# Patient Record
Sex: MALE | Race: BLACK OR AFRICAN AMERICAN | NOT HISPANIC OR LATINO | Employment: FULL TIME | URBAN - METROPOLITAN AREA
[De-identification: names, ages, dates, MRNs, and addresses within clinical notes are randomized per-mention and may not be internally consistent; named-entity substitution may affect disease eponyms.]

---

## 2024-08-22 ENCOUNTER — HOSPITAL ENCOUNTER (OUTPATIENT)
Dept: RADIOLOGY | Facility: HOSPITAL | Age: 24
Discharge: HOME | End: 2024-08-22
Payer: MEDICARE

## 2024-08-22 ENCOUNTER — OFFICE VISIT (OUTPATIENT)
Dept: ORTHOPEDIC SURGERY | Facility: HOSPITAL | Age: 24
End: 2024-08-22
Payer: MEDICARE

## 2024-08-22 DIAGNOSIS — M25.812 IMPINGEMENT OF LEFT SHOULDER: ICD-10-CM

## 2024-08-22 DIAGNOSIS — M25.512 LEFT SHOULDER PAIN, UNSPECIFIED CHRONICITY: ICD-10-CM

## 2024-08-22 DIAGNOSIS — M25.312 INSTABILITY OF LEFT SHOULDER JOINT: Primary | ICD-10-CM

## 2024-08-22 PROCEDURE — 99214 OFFICE O/P EST MOD 30 MIN: CPT

## 2024-08-22 PROCEDURE — 73030 X-RAY EXAM OF SHOULDER: CPT | Mod: LT

## 2024-08-22 PROCEDURE — 99204 OFFICE O/P NEW MOD 45 MIN: CPT

## 2024-08-22 RX ORDER — METHYLPREDNISOLONE 4 MG/1
TABLET ORAL
Qty: 21 TABLET | Refills: 0 | Status: SHIPPED | OUTPATIENT
Start: 2024-08-22 | End: 2024-08-29

## 2024-08-22 ASSESSMENT — PAIN DESCRIPTION - DESCRIPTORS: DESCRIPTORS: NUMBNESS;TINGLING

## 2024-08-22 ASSESSMENT — PAIN - FUNCTIONAL ASSESSMENT: PAIN_FUNCTIONAL_ASSESSMENT: 0-10

## 2024-08-22 NOTE — PROGRESS NOTES
PRIMARY CARE PHYSICIAN: Coreen Whitman DO  REFERRING PROVIDER: No referring provider defined for this encounter.     CONSULT ORTHOPAEDIC: Shoulder Evaluation    ASSESSMENT & PLAN    Impression: 24 y.o. male with left shoulder instability versus cervical radiculopathy     Plan:   I explained to the patient the nature of their diagnosis.  I reviewed their imaging studies with them.    Based on the history, physical exam and imaging studies above, the patient's presentation is consistent with the above diagnosis.  I had a long discussion with the patient regarding their presentation and the treatment options.  We discussed initial nonoperative versus operative management options as well as potential further diagnostic imaging. Patient presents today with 1 month of left shoulder pain without a specifically known ERNST. His pain has progressed to now involve some associated neurological symptoms down the left arm. Due to this patient was provided with a Medrol Dose Pack. I also provided patient with a referral to physical therapy to work on his rotator cuff strengthening and scapular stabilization. He is already scheduled to follow-up with shoulder/upper extremity specialist, Dr. Thibodeaux this coming Monday. I encouraged patient to follow up with this appointment for further evaluation and discussion of treatment options.     At the end of the visit, all questions were answered in full. The patient is in agreement with the plan and recommendations. They will call the office with any questions/concerns.    Note dictated with IActionable software. Completed without full typed error editing and sent to avoid delay.       SUBJECTIVE  CHIEF COMPLAINT:   Chief Complaint   Patient presents with    Left Shoulder - Pain        HPI: Cecil Rehman is a pleasant 24 y.o. male who presents to [facility] with left shoulder pain.  Patient reports his pain began approximately 1 month ago with no known injury.  States he could  have strained it with lifting however does not recall a specific moment.  Also reports participating in some recent water sports and feels he could have strained the left shoulder then.  Pain is localized to the posterior shoulder.  He reports some popping of the left shoulder however no feeling of instability or subluxation events.  Pain is worse at night.  He presented to his PCP for this on 8/16 and was told he had shoulder impingement.  He was prescribed naproxen which is helpful during the day however he still has significant pain at night.  His pain is worsened and now he has developed some radiation/numbness/tingling into the left arm down to hand.  This is only present at night.  Better during the day.  His PCP did provide him with a referral to sports medicine and he scheduled to see Dr. Monsalve on Monday.  He would like something done in the meantime as he is having significant difficulty with sleeping.  Denies history of neck injuries or pain.  No trauma to the shoulder.  He does not feel he is lost any range of motion but strengthening exercises are difficult.  Denies history of left shoulder issues, surgeries or injections.    They deny any associated neck pain.  No numbness, tingling, or paresthesias.    REVIEW OF SYSTEMS  Constitutional: See HPI for pain assessment, No significant recent weight gain or loss, no fever or chills  Cardiovascular: No chest pain, shortness of breath  Respiratory: No difficulty breathing, no cough  Gastrointestinal: No nausea, vomiting, diarrhea, constipation  Musculoskeletal: Noted in HPI, positive for pain, restricted motion, stiffness  Integumentary: No rashes, easy bruising or skin lesions  Neurological: No headache, no numbness or tingling in extremities  Psychiatric: No mood changes or memory changes. No social issues  Heme/Lymph: No excessive swelling, easy bruising or excessive bleeding  ENT: No hearing changes, no nosebleeds  Eyes: No vision changes    Past Medical  History:   Diagnosis Date    Cramp and spasm     Muscle cramp    Epistaxis     Frequent nosebleeds        No Known Allergies     No past surgical history on file.     No family history on file.     Social History     Socioeconomic History    Marital status: Single     Spouse name: Not on file    Number of children: Not on file    Years of education: Not on file    Highest education level: Not on file   Occupational History    Not on file   Tobacco Use    Smoking status: Not on file    Smokeless tobacco: Not on file   Substance and Sexual Activity    Alcohol use: Not on file    Drug use: Not on file    Sexual activity: Not on file   Other Topics Concern    Not on file   Social History Narrative    Not on file     Social Determinants of Health     Financial Resource Strain: Not on file   Food Insecurity: Not on file   Transportation Needs: Not on file   Physical Activity: Not on file   Stress: Not on file   Social Connections: Not on file   Intimate Partner Violence: Not on file   Housing Stability: Not on file        CURRENT MEDICATIONS:   Current Outpatient Medications   Medication Sig Dispense Refill    methylPREDNISolone (Medrol Dospak) 4 mg tablets Take as directed on package. 21 tablet 0     No current facility-administered medications for this visit.        OBJECTIVE    PHYSICAL EXAM       No data to display               There is no height or weight on file to calculate BMI.    GENERAL: A/Ox3, NAD. Appears healthy, well nourished  PSYCHIATRIC: Mood stable, appropriate memory recall  EYES: EOM intact, no scleral icterus  CARDIOVASCULAR: Palpable peripheral pulses  LUNGS: Breathing non-labored on room air  SKIN: No open lesions, rashes, ulcerations    MUSCULOSKELETAL:  Laterality: Left shoulder Exam  - Negative Spurlings, full painless neck ROM  - Skin intact  - No erythema or warmth  - No ecchymosis or soft tissue swelling  - Shoulder ROM:  forward flexion 150, EF 50, IR lower thoracic  - Strength:      Jobes  5/5     ER 5/5     Belly press and bearhug 5/5  - Palpation: positive TTP posterior joint line  - Ellis and Neers negative  - Speeds and O'Briens positive  - Special Tests: positive parmjit and jerk    NEUROVASCULAR:  - Neurovascular Status: sensation intact to light touch distally, upper extremity motor grossly intact  - Capillary refill brisk at extremities, Bilateral peripheral pulses 2+    Imaging: Multiple views of the affected left shoulder(s) demonstrate: no acute osseous abnormality, no fracture, no significant degenerative changes.   X-rays were personally reviewed and interpreted by me.  Radiology reports were reviewed by me as well, if readily available at the time.

## 2024-08-26 ENCOUNTER — APPOINTMENT (OUTPATIENT)
Dept: ORTHOPEDIC SURGERY | Facility: CLINIC | Age: 24
End: 2024-08-26
Payer: MEDICARE

## 2024-08-26 VITALS — BODY MASS INDEX: 21.67 KG/M2 | WEIGHT: 160 LBS | HEIGHT: 72 IN

## 2024-08-26 DIAGNOSIS — R20.2 NUMBNESS AND TINGLING IN LEFT HAND: ICD-10-CM

## 2024-08-26 DIAGNOSIS — M25.812 IMPINGEMENT OF LEFT SHOULDER: ICD-10-CM

## 2024-08-26 DIAGNOSIS — M25.312 MULTIDIRECTIONAL INSTABILITY OF LEFT GLENOHUMERAL JOINT: Primary | ICD-10-CM

## 2024-08-26 DIAGNOSIS — M25.312 DYSKINESIS OF LEFT SCAPULA: ICD-10-CM

## 2024-08-26 DIAGNOSIS — R20.0 NUMBNESS AND TINGLING IN LEFT HAND: ICD-10-CM

## 2024-08-26 PROCEDURE — 3008F BODY MASS INDEX DOCD: CPT | Performed by: ORTHOPAEDIC SURGERY

## 2024-08-26 PROCEDURE — 99204 OFFICE O/P NEW MOD 45 MIN: CPT | Performed by: ORTHOPAEDIC SURGERY

## 2024-08-26 ASSESSMENT — PAIN SCALES - GENERAL: PAINLEVEL_OUTOF10: 2

## 2024-08-26 ASSESSMENT — PAIN - FUNCTIONAL ASSESSMENT: PAIN_FUNCTIONAL_ASSESSMENT: 0-10
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
## 2024-09-03 ENCOUNTER — EVALUATION (OUTPATIENT)
Dept: PHYSICAL THERAPY | Facility: CLINIC | Age: 24
End: 2024-09-03
Payer: MEDICARE

## 2024-09-03 DIAGNOSIS — M25.812 IMPINGEMENT OF LEFT SHOULDER: Primary | ICD-10-CM

## 2024-09-03 DIAGNOSIS — M25.312 SHOULDER INSTABILITY, LEFT: ICD-10-CM

## 2024-09-03 PROCEDURE — 97161 PT EVAL LOW COMPLEX 20 MIN: CPT | Mod: GP | Performed by: PHYSICAL THERAPIST

## 2024-09-03 PROCEDURE — 97110 THERAPEUTIC EXERCISES: CPT | Mod: GP | Performed by: PHYSICAL THERAPIST

## 2024-09-03 ASSESSMENT — PATIENT HEALTH QUESTIONNAIRE - PHQ9
SUM OF ALL RESPONSES TO PHQ9 QUESTIONS 1 AND 2: 0
2. FEELING DOWN, DEPRESSED OR HOPELESS: NOT AT ALL
1. LITTLE INTEREST OR PLEASURE IN DOING THINGS: NOT AT ALL

## 2024-09-03 ASSESSMENT — ENCOUNTER SYMPTOMS
DEPRESSION: 0
OCCASIONAL FEELINGS OF UNSTEADINESS: 0
LOSS OF SENSATION IN FEET: 0

## 2024-09-03 ASSESSMENT — PAIN SCALES - GENERAL: PAINLEVEL_OUTOF10: 0 - NO PAIN

## 2024-09-03 ASSESSMENT — PAIN - FUNCTIONAL ASSESSMENT: PAIN_FUNCTIONAL_ASSESSMENT: 0-10
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
## 2024-09-10 ENCOUNTER — HOSPITAL ENCOUNTER (OUTPATIENT)
Dept: NEUROLOGY | Facility: CLINIC | Age: 24
Discharge: HOME | End: 2024-09-10
Payer: MEDICARE

## 2024-09-10 DIAGNOSIS — R20.2 NUMBNESS AND TINGLING IN LEFT HAND: ICD-10-CM

## 2024-09-10 DIAGNOSIS — R20.0 NUMBNESS AND TINGLING IN LEFT HAND: ICD-10-CM

## 2024-09-10 PROCEDURE — 95911 NRV CNDJ TEST 9-10 STUDIES: CPT | Performed by: STUDENT IN AN ORGANIZED HEALTH CARE EDUCATION/TRAINING PROGRAM

## 2024-09-10 PROCEDURE — 95886 MUSC TEST DONE W/N TEST COMP: CPT | Performed by: STUDENT IN AN ORGANIZED HEALTH CARE EDUCATION/TRAINING PROGRAM

## 2024-09-13 ENCOUNTER — TREATMENT (OUTPATIENT)
Dept: PHYSICAL THERAPY | Facility: CLINIC | Age: 24
End: 2024-09-13
Payer: MEDICARE

## 2024-09-13 DIAGNOSIS — M25.812 IMPINGEMENT OF LEFT SHOULDER: ICD-10-CM

## 2024-09-13 DIAGNOSIS — M25.312 SHOULDER INSTABILITY, LEFT: ICD-10-CM

## 2024-09-13 PROCEDURE — 97110 THERAPEUTIC EXERCISES: CPT | Mod: GP,CQ

## 2024-09-13 ASSESSMENT — PAIN - FUNCTIONAL ASSESSMENT: PAIN_FUNCTIONAL_ASSESSMENT: 0-10

## 2024-09-13 ASSESSMENT — PAIN SCALES - GENERAL: PAINLEVEL_OUTOF10: 0 - NO PAIN

## 2024-09-13 NOTE — PROGRESS NOTES
"Physical Therapy    Physical Therapy Treatment    Patient Name: Cecil Rehman  MRN: [id number]  Today's Date: 9/13/2024    Time Entry:   Time Calculation  Start Time: 1100  Stop Time: 1140  Time Calculation (min): 40 min     PT Therapeutic Procedures Time Entry  Therapeutic Exercise Time Entry: 40                   Assessment:   Pt reports no increase in left shoulder/UE pain at the end of his treatment. Moderately fatigued.    Plan:   Assess tolerance of today's visit.    Current Problem  1. Impingement of left shoulder  Follow Up In Physical Therapy      2. Shoulder instability, left  Follow Up In Physical Therapy                Subjective    Pt reports that he has been having bilateral hand tingling and has not been able to \"\" anything the past several days. He had an EMG yesterday. Pt has not received the results. He was recommended to make an appointment with a neurologist.    Precautions   Precautions  STEADI Fall Risk Score (The score of 4 or more indicates an increased risk of falling): 0        Pain  Pain Assessment  Pain Assessment: 0-10  0-10 (Numeric) Pain Score: 0 - No pain  Pain Location: Shoulder  Pain Orientation: Left    Objective   Reviewed home exercises  Initiated flow sheet    Treatments:  EXERCISES Date 9/3/24 Date 9/13/24 Date Date    REPS REPS REPS REPS   Mid row Black 30X Black 30x     Pull down Black 30X Black 30x     IR Black 15X Black 30x     ER Black 30X Black 30x            Ball on wall 2#  A-Z 2# A-Z            Prone- Hughstons-I, T, Y next 20x each                   Body blade                     Quadruped-wt shift onto left UE  10x10                   Pulley flex  3 min     Pulley scaption  3 min                                                                    Shoulder stabil. tape applied                    HEP           OP EDUCATION:       Goals:  Pt will have full pain free left shoulder ROM in all planes-4 weeks  Pt will have 5/5 left shoulder strength and good stability " in all planes-4 weeks  Pt will be independent with HEP-4 weeks  Improve Quickdash score < 15 to facilitate return to prior level of  function-4 weeks

## 2024-09-16 ENCOUNTER — DOCUMENTATION (OUTPATIENT)
Dept: PHYSICAL THERAPY | Facility: CLINIC | Age: 24
End: 2024-09-16
Payer: MEDICARE

## 2024-09-16 NOTE — PROGRESS NOTES
Physical Therapy                 Therapy Communication Note    Patient Name: Cecil Rehman  MRN: [id number]  Department:   Room: Room/bed info not found  Today's Date: 9/16/2024     Discipline: Physical Therapy    Missed Visit Reason:      Missed Time: No Show    Comment:

## 2024-09-20 ENCOUNTER — TREATMENT (OUTPATIENT)
Dept: PHYSICAL THERAPY | Facility: CLINIC | Age: 24
End: 2024-09-20
Payer: MEDICARE

## 2024-09-20 DIAGNOSIS — M25.312 SHOULDER INSTABILITY, LEFT: ICD-10-CM

## 2024-09-20 DIAGNOSIS — M25.812 IMPINGEMENT OF LEFT SHOULDER: ICD-10-CM

## 2024-09-20 PROCEDURE — 97110 THERAPEUTIC EXERCISES: CPT | Mod: GP | Performed by: PHYSICAL THERAPIST

## 2024-09-20 ASSESSMENT — PAIN SCALES - GENERAL: PAINLEVEL_OUTOF10: 2

## 2024-09-20 ASSESSMENT — PAIN - FUNCTIONAL ASSESSMENT: PAIN_FUNCTIONAL_ASSESSMENT: 0-10

## 2024-09-20 NOTE — PROGRESS NOTES
"Physical Therapy    Physical Therapy Treatment    Patient Name: Cecil Rehman  MRN: [id number]  Today's Date: 9/20/2024    Time Entry:   Time Calculation  Start Time: 0200  Stop Time: 0250  Time Calculation (min): 50 min     PT Therapeutic Procedures Time Entry  Therapeutic Exercise Time Entry: 40              Non-Billable Time  Non-billable time: 10  Non-billable time reason: CP    Assessment:   Tolerating progression of strengthening well, but continues to have left posterior shoulder pain with certain movements. Needs continued shoulder strengthening and stabilization to decrease vijay..    Plan:   Assess tolerance of today's visit.    Current Problem  1. Impingement of left shoulder  Follow Up In Physical Therapy      2. Shoulder instability, left  Follow Up In Physical Therapy                Subjective    Pt reports that the EMG results were normal. Reports that he returned to the gym yesterday and had increased shoulder pain.  strength and paresthesias have improved somewhat.    Precautions   Precautions  STEADI Fall Risk Score (The score of 4 or more indicates an increased risk of falling): 0        Pain  Pain Assessment  Pain Assessment: 0-10  0-10 (Numeric) Pain Score: 2  Pain Location: Shoulder  Pain Orientation: Left    Objective   Reviewed home exercises  Initiated flow sheet    Treatments:  EXERCISES Date 9/3/24 Date 9/13/24 Date 9/20/24 Date    REPS REPS REPS REPS   Mid row Black 30X Black 30x Black 30X    Pull down Black 30X Black 30x Black 30X    IR Black 15X Black 30x Black 30X    ER Black 30X Black 30x Black 30X           Ball on wall 2#  A-Z 2# A-Z 2#  A-Z           Prone- Hughstons-I, T, Y next 20x each 20X ea    Body blade-flexion and abduction   30\" X 2 ea    BTE-ROM                            Quadruped-wt shift onto left UE  10x10 10 X 10                  Pulley flex  3 min     Pulley scaption  3 min            Diagonal pull down   10#  2 X 10 ea direction                                    "                  Shoulder stabil. tape applied                    HEP           OP EDUCATION:       Goals:  Pt will have full pain free left shoulder ROM in all planes-4 weeks  Pt will have 5/5 left shoulder strength and good stability in all planes-4 weeks  Pt will be independent with HEP-4 weeks  Improve Quickdash score < 15 to facilitate return to prior level of  function-4 weeks

## 2024-09-23 ENCOUNTER — DOCUMENTATION (OUTPATIENT)
Dept: PHYSICAL THERAPY | Facility: CLINIC | Age: 24
End: 2024-09-23
Payer: MEDICARE

## 2024-09-23 ENCOUNTER — APPOINTMENT (OUTPATIENT)
Dept: PHYSICAL THERAPY | Facility: CLINIC | Age: 24
End: 2024-09-23
Payer: MEDICARE

## 2024-09-23 NOTE — PROGRESS NOTES
Physical Therapy                 Therapy Communication Note    Patient Name: Cecil Rehman  MRN: [id number]  Department: [address]  Room: Room/bed info not found  Today's Date: 9/23/2024     Discipline: Physical Therapy    Missed Visit Reason:  schedule conflict    Missed Time: Cancel    Comment:

## 2024-09-27 ENCOUNTER — TREATMENT (OUTPATIENT)
Dept: PHYSICAL THERAPY | Facility: CLINIC | Age: 24
End: 2024-09-27
Payer: MEDICARE

## 2024-09-27 DIAGNOSIS — M25.312 SHOULDER INSTABILITY, LEFT: ICD-10-CM

## 2024-09-27 DIAGNOSIS — M25.812 IMPINGEMENT OF LEFT SHOULDER: ICD-10-CM

## 2024-09-27 PROCEDURE — 97110 THERAPEUTIC EXERCISES: CPT | Mod: GP,CQ

## 2024-09-27 ASSESSMENT — PAIN SCALES - GENERAL: PAINLEVEL_OUTOF10: 2

## 2024-09-27 ASSESSMENT — PAIN - FUNCTIONAL ASSESSMENT: PAIN_FUNCTIONAL_ASSESSMENT: 0-10

## 2024-09-27 NOTE — PROGRESS NOTES
"Physical Therapy    Physical Therapy Treatment    Patient Name: Cecil Rehman  MRN: [id number]  Today's Date: 9/27/2024    Time Entry:   Time Calculation  Start Time: 0200  Stop Time: 0250  Time Calculation (min): 50 min     PT Therapeutic Procedures Time Entry  Therapeutic Exercise Time Entry: 50               Assessment:   Pt reports that he gets posterior shoulder soreness with certain motions ie. T-band ER through full range  Pts shoulder was fatigued at the end of his session. No increase in pain.      Plan:   Assess tolerance of today's visit.    Current Problem  1. Impingement of left shoulder  Follow Up In Physical Therapy      2. Shoulder instability, left  Follow Up In Physical Therapy                Subjective    Pt started working out at the gym one day a week. He is not doing any strengthening exercises overhead secondary to increased left shoulder soreness.   Pt reports that the tingling in his hands and  strength is improving slowly.  Pt will get an MRI once his insurance approves it.     Precautions   Precautions  STEADI Fall Risk Score (The score of 4 or more indicates an increased risk of falling): 0      Pain  Pain Assessment  Pain Assessment: 0-10  0-10 (Numeric) Pain Score: 2  Pain Location: Shoulder  Pain Orientation: Left    Objective   Added BTE ROM and T-band shoulder adduction    Treatments:  EXERCISES Date 9/3/24 Date 9/13/24 Date 9/20/24 Date 9/27/24    REPS REPS REPS REPS   Mid row Black 30X Black 30x Black 30X Black 30x   Pull down Black 30X Black 30x Black 30X Black 30x   IR Black 15X Black 30x Black 30X Black 30x   ER Black 30X Black 30x Black 30X Black 30x       Black 30x   Ball on wall 2#  A-Z 2# A-Z 2#  A-Z 2# A-Z          Prone- Hughrichards-I, T, Y next 20x each 20X ea 20X ea   Body blade-flexion and abduction   30\" X 2 ea 30\" X2 ea   BTE-ROM                            Quadruped-wt shift onto left UE  10x10 10 X 10 10X                 Pulley flex  3 min     Pulley scaption  3 " min            Diagonal pull down   10#  2 X 10 ea direction 10# 2x10 ea  direction                                                    Shoulder stabil. tape applied                    HEP           OP EDUCATION:     Goals:  Pt will have full pain free left shoulder ROM in all planes-4 weeks  Pt will have 5/5 left shoulder strength and good stability in all planes-4 weeks  Pt will be independent with HEP-4 weeks  Improve Quickdash score < 15 to facilitate return to prior level of  function-4 weeks

## 2024-09-30 ENCOUNTER — TREATMENT (OUTPATIENT)
Dept: PHYSICAL THERAPY | Facility: CLINIC | Age: 24
End: 2024-09-30
Payer: MEDICARE

## 2024-09-30 DIAGNOSIS — M25.812 IMPINGEMENT OF LEFT SHOULDER: ICD-10-CM

## 2024-09-30 DIAGNOSIS — M25.312 SHOULDER INSTABILITY, LEFT: ICD-10-CM

## 2024-09-30 PROCEDURE — 97110 THERAPEUTIC EXERCISES: CPT | Mod: GP,CQ

## 2024-09-30 ASSESSMENT — PAIN SCALES - GENERAL: PAINLEVEL_OUTOF10: 0 - NO PAIN

## 2024-09-30 ASSESSMENT — PAIN - FUNCTIONAL ASSESSMENT: PAIN_FUNCTIONAL_ASSESSMENT: 0-10

## 2024-09-30 NOTE — PROGRESS NOTES
"Physical Therapy    Physical Therapy Treatment    Patient Name: Cecil Rehman  MRN: [id number]  Today's Date: 9/30/2024    Time Entry:   Time Calculation  Start Time: 0200  Stop Time: 0250  Time Calculation (min): 50 min     PT Therapeutic Procedures Time Entry  Therapeutic Exercise Time Entry: 50               Assessment:   Pt is tolerating exercise progression well. No increase in left posterior shoulder pain at the end of his session.   Monitor diagonal pull downs. Pt reported temporary \"sharp\" posterior shoulder pain at the top of the motion. No pain with decreased weight.      Plan:   Continue to progress shoulder and scapular strengthening exercises as tolerated.    MMT left shoulder deficit areas    Current Problem  1. Impingement of left shoulder  Follow Up In Physical Therapy      2. Shoulder instability, left  Follow Up In Physical Therapy                Subjective    Pt reports that the tingling in his hands has improved. No shoulder pain today. Posterior left shoulder pain increases with reaching over head and it feels like his shoulder is getting \"stuck\".  Pt will get an MRI once his insurance approves it.     Precautions   Precautions  STEADI Fall Risk Score (The score of 4 or more indicates an increased risk of falling): 0      Pain  Pain Assessment  Pain Assessment: 0-10  0-10 (Numeric) Pain Score: 0 - No pain  Pain Location: Shoulder  Pain Orientation: Left    Objective   Added BTE push/pull, H. Push/pull  Increased shoulder and scapular strengthening reps - see flow sheet    Treatments:  EXERCISES Date 9/3/24 Date 9/13/24 Date 9/20/24 Date 9/27/24 9/30/24    REPS REPS REPS REPS    Mid row Black 30X Black 30x Black 30X Black 30x Black 30x   Pull down Black 30X Black 30x Black 30X Black 30x Black 30x   IR Black 15X Black 30x Black 30X Black 30x Black 30x   ER Black 30X Black 30x Black 30X Black 30x Black 30x       Black 30x Black 30x   Ball on wall 2#  A-Z 2# A-Z 2#  A-Z 2# A-Z 2# A-Z   2X         " "  Prone- Hughstons-I, T, Y next 20x each 20X ea 20X ea 2X15 ea   Body blade-flexion and abduction   30\" X 2 ea 30\" X2 ea 45\"X1 ea  30\"X1 ea   BTE-ROM    T-87  60\" T- 87 90\"   BTE H push /pull     T- 72 90\"   BTE push/pull     T- 72 90\"           Quadruped-wt shift onto left UE  10x10 10 X 10 10X 10X                   Pulley flex  3 min   3 min   Pulley scaption  3 min              Diagonal pull down   10#  2 X 10 ea direction 10# 2x10 ea  direction 5# 2x10 ea  Monitor pain                                                           Shoulder stabil. tape applied                       HEP            OP EDUCATION:     Goals:  Pt will have full pain free left shoulder ROM in all planes-4 weeks  Pt will have 5/5 left shoulder strength and good stability in all planes-4 weeks  Pt will be independent with HEP-4 weeks  Improve Quickdash score < 15 to facilitate return to prior level of  function-4 weeks      "

## 2024-10-07 ENCOUNTER — DOCUMENTATION (OUTPATIENT)
Dept: PHYSICAL THERAPY | Facility: CLINIC | Age: 24
End: 2024-10-07
Payer: MEDICARE

## 2024-10-07 ENCOUNTER — APPOINTMENT (OUTPATIENT)
Dept: PHYSICAL THERAPY | Facility: CLINIC | Age: 24
End: 2024-10-07
Payer: MEDICARE

## 2024-10-07 NOTE — PROGRESS NOTES
Physical Therapy                 Therapy Communication Note    Patient Name: Cecil Rehman  MRN: [id number]  Department:   Room: Room/bed info not found  Today's Date: 10/7/2024     Discipline: Physical Therapy    Missed Visit Reason:      Missed Time: Cancel    Comment:

## 2024-10-11 ENCOUNTER — TREATMENT (OUTPATIENT)
Dept: PHYSICAL THERAPY | Facility: CLINIC | Age: 24
End: 2024-10-11
Payer: MEDICARE

## 2024-10-11 DIAGNOSIS — M25.812 IMPINGEMENT OF LEFT SHOULDER: Primary | ICD-10-CM

## 2024-10-11 DIAGNOSIS — M25.312 SHOULDER INSTABILITY, LEFT: ICD-10-CM

## 2024-10-11 PROCEDURE — 97110 THERAPEUTIC EXERCISES: CPT | Mod: GP

## 2024-10-11 ASSESSMENT — PAIN SCALES - GENERAL: PAINLEVEL_OUTOF10: 0 - NO PAIN

## 2024-10-11 ASSESSMENT — PAIN - FUNCTIONAL ASSESSMENT: PAIN_FUNCTIONAL_ASSESSMENT: 0-10

## 2024-10-11 NOTE — PROGRESS NOTES
"Physical Therapy    Physical Therapy Treatment (reassessment)    Patient Name: Cecil Rehman  MRN: [id number]  Today's Date: 10/11/2024    Time Entry:   Time Calculation  Start Time: 1405  Stop Time: 1425  Time Calculation (min): 20 min     PT Therapeutic Procedures Time Entry  Therapeutic Exercise Time Entry: 20               Visit: #5  Assessment:   Patient reports they feel that PT has helped strengthen and stabilize their shoulder, but pain relief has been limited. Upon reassessment, patient displays some improvements in strength measures and is able to perform HEP independently. Patient wants to continue with PT for another month until they receive their MRI and wants to change the frequency of visits to once per week.    Plan:   Continue to progress shoulder and scapular strengthening exercises as tolerated.    MMT left shoulder deficit areas    Current Problem     1. Impingement of left shoulder  Follow Up In Physical Therapy       2. Shoulder instability, left  Follow Up In Physical Therapy        Subjective    Patient reports they are experiencing minor pains in their shoulder and that their shoulder continues to get \"stuck\" when reaching overhead. Patient also reports they have an MRI scheduled for 11/5/24.    Precautions   Precautions  STEADI Fall Risk Score (The score of 4 or more indicates an increased risk of falling): 0      Pain  Pain Assessment  Pain Assessment: 0-10  0-10 (Numeric) Pain Score: 0 - No pain  Pain Location: Shoulder  Pain Orientation: Left    Objective   L Shoulder ROM: (degrees)  Flex=140  Abd= 64  ER = 92  IR = 80    L shoulder Strength:  Flex= 4+/5  Abd= 4/5 with pain  ER = 5/5  IR =5/5      Treatments:  EXERCISES Date 9/20/24 Date 9/27/24 9/30/24 Date 9/30/24    REPS REPS  Recheck 20 min   Mid row Black 30X Black 30x Black 30x    Pull down Black 30X Black 30x Black 30x    IR Black 30X Black 30x Black 30x    ER Black 30X Black 30x Black 30x      Black 30x Black 30x    Ball on wall " "2#  A-Z 2# A-Z 2# A-Z   2X           Prone- Hughstons-I, T, Y 20X ea 20X ea 2X15 ea    Body blade-flexion and abduction 30\" X 2 ea 30\" X2 ea 45\"X1 ea  30\"X1 ea    BTE-ROM  T-87  60\" T- 87 90\"    BTE H push /pull   T- 72 90\"    BTE push/pull   T- 72 90\"           Quadruped-wt shift onto left UE 10 X 10 10X 10X                  Pulley flex   3 min    Pulley scaption              Diagonal pull down 10#  2 X 10 ea direction 10# 2x10 ea  direction 5# 2x10 ea  Monitor pain                                                     Shoulder stabil. tape                     HEP         OP EDUCATION:   Not today    Goals:  Pt will have full pain free left shoulder ROM in all planes-8 weeks  Pt will have 5/5 left shoulder strength and good stability in all planes-8 weeks  Pt will be independent with HEP- goal met  Improve Quickdash score < 15 to facilitate return to prior level of  function-8 weeks      "

## 2024-10-15 ENCOUNTER — TREATMENT (OUTPATIENT)
Dept: PHYSICAL THERAPY | Facility: CLINIC | Age: 24
End: 2024-10-15
Payer: MEDICARE

## 2024-10-15 DIAGNOSIS — M25.312 SHOULDER INSTABILITY, LEFT: ICD-10-CM

## 2024-10-15 DIAGNOSIS — M25.812 IMPINGEMENT OF LEFT SHOULDER: ICD-10-CM

## 2024-10-15 PROCEDURE — 97110 THERAPEUTIC EXERCISES: CPT | Mod: GP,CQ

## 2024-10-15 ASSESSMENT — PAIN DESCRIPTION - DESCRIPTORS
DESCRIPTORS: ACHING
DESCRIPTORS: ACHING

## 2024-10-15 ASSESSMENT — PAIN SCALES - GENERAL
PAINLEVEL_OUTOF10: 0 - NO PAIN
PAINLEVEL_OUTOF10: 0 - NO PAIN

## 2024-10-15 ASSESSMENT — PAIN - FUNCTIONAL ASSESSMENT
PAIN_FUNCTIONAL_ASSESSMENT: 0-10
PAIN_FUNCTIONAL_ASSESSMENT: 0-10

## 2024-10-15 NOTE — PROGRESS NOTES
"Physical Therapy    Physical Therapy Treatment    Patient Name: Cecil Rehman  MRN: [id number]  Today's Date: 10/15/2024  :2000  Time Entry:   Time Calculation  Start Time: 1615  Stop Time: 1700  Time Calculation (min): 45 min  PT Therapeutic Procedures Time Entry  Therapeutic Exercise Time Entry: 43            Visit:  7  Visit limit: 20 visits per year        Assessment:   Patient performed added exercises without complaints of increased left shoulder or upper extremity symptoms. Patient presented with improved left shoulder strength measures since last recorded measurements. Patient continues to present with painful arc with active movements in frontal and sagittal planes.     Plan:   Continue with left posterior shoulder and rotator cuff strengthening program progressing as tolerated to return patient to prior functional status.     Patient scheduled for MRI 2024.      Current Problem     1. Impingement of left shoulder  Follow Up In Physical Therapy       2. Shoulder instability, left  Follow Up In Physical Therapy        Subjective    Patient reports continues to experience pinching of left shoulder with active movements which involve overhead reaching.     Precautions  Precautions  Precautions Comment: None      Pain  Pain Assessment  Pain Assessment: 0-10  0-10 (Numeric) Pain Score: 0 - No pain  Pain Location: Shoulder  Pain Orientation: Left  Pain Descriptors: Aching (Weakness)    Objective   Left shoulder strength:  Flexion = 4+/5  Abduction = 4+/5  Scaption = 4+/5  Internal rotation = 5/5  External rotation =5/5    AROM left shoulder painful arc  Flexion =  degrees  Abduction =  degrees      Treatments:  EXERCISES Date 10/15/2024         Mid row Black 30x    Pull down Black 30x    IR Black 30x    ER Black 30x     Black 30x    Ball on wall 2# A-Z   2X         Prone- Tracey-I, T, Y Next         Body blade flexion up/side 30\" x 1 each    Body blade scaption up/side 30\" x 1 each  " "       BTE ROM T-93 100\" each    BTE H.Push/Pull T-177 100\"    BTE  Push/Pull T-177 100\"    BTE IR/ER T-63 100\"         Quadruped-wt shift onto left UE 10X         Sidelying ER 4# 3 x 15    Serratus punches 10# 3 x 15         Pulley flex 3 minutes    An scaption          Diagonal pull down 5# 2x10 ea  Monitor pain                                       Shoulder stabil. tape               HEP       OP EDUCATION:   Not today    Goals:  Pt will have full pain free left shoulder ROM in all planes-8 weeks   10/15/2024 progressing  Pt will have 5/5 left shoulder strength and good stability in all planes-8 weeks   10/15/2024 progressing  Pt will be independent with HEP- goal met  Improve Quickdash score < 15 to facilitate return to prior level of  function-8 weeks      "

## 2024-10-21 ENCOUNTER — TREATMENT (OUTPATIENT)
Dept: PHYSICAL THERAPY | Facility: CLINIC | Age: 24
End: 2024-10-21
Payer: MEDICARE

## 2024-10-21 DIAGNOSIS — M25.312 SHOULDER INSTABILITY, LEFT: ICD-10-CM

## 2024-10-21 DIAGNOSIS — M25.812 IMPINGEMENT OF LEFT SHOULDER: ICD-10-CM

## 2024-10-21 PROCEDURE — 97110 THERAPEUTIC EXERCISES: CPT | Mod: GP,CQ

## 2024-10-21 ASSESSMENT — PAIN - FUNCTIONAL ASSESSMENT: PAIN_FUNCTIONAL_ASSESSMENT: 0-10

## 2024-10-21 ASSESSMENT — PAIN DESCRIPTION - DESCRIPTORS: DESCRIPTORS: ACHING

## 2024-10-21 ASSESSMENT — PAIN SCALES - GENERAL: PAINLEVEL_OUTOF10: 0 - NO PAIN

## 2024-10-21 NOTE — PROGRESS NOTES
Physical Therapy    Physical Therapy Treatment    Patient Name: Cecil Rehman  MRN: [id number]  Today's Date: 10/21/2024  :2000  Time Entry:   Time Calculation  Start Time: 1445  Stop Time: 1535  Time Calculation (min): 50 min  PT Therapeutic Procedures Time Entry  Therapeutic Exercise Time Entry: 48            Visit:  8  Visit limit: 20 visits per year        Assessment:   Patient tolerated increases in  exercise program without complaints of increased left shoulder or upper extremity symptoms. Patient presented with consistent left shoulder strength measurements since last recorded measurements. Patient reports MRI is still scheduled for 2024.    Plan:   Continue with left posterior shoulder and rotator cuff strengthening program progressing as tolerated to return patient to prior functional status.     Patient scheduled for MRI 2024.      Current Problem     1. Impingement of left shoulder  Follow Up In Physical Therapy       2. Shoulder instability, left  Follow Up In Physical Therapy        Subjective    Patient reports continues to experience left shoulder discomfort with laying on left side. Patient indicates pain continues to disturb sleep.     Precautions  Precautions  Precautions Comment: None      Pain  Pain Assessment  Pain Assessment: 0-10  0-10 (Numeric) Pain Score: 0 - No pain  Pain Location: Shoulder  Pain Orientation: Left  Pain Descriptors: Aching    Objective   Added and increased exercises. See exercise log for specifics.     Left shoulder strength:  Flexion = 4+/5  Abduction = 4+/5  Scaption = 4+/5    Outcomes  Quick DASH - Score as of 10/11/2024 = 29      Treatments:  EXERCISES Date 10/15/2024 Date 10/21/2024        Mid row Black 30x Black x 30   Pull down Black 30x Black x 30   IR Black 30x Black x 30   ER Black 30x Black x 30   Theraband flexion Black 30x Black x 30   Theraband adduction  Black x 30        Ball on wall 2# A-Z   2X 2# A->Z  x 2        Prone- Carol  "T, Y Next Flexion 2# x 15  H. Abd 2# x 15  Extension 2# x 15  90/90 ER 2# x 15        Body blade flexion up/side 30\" x 1 each 30\" x 1 each   Body blade scaption up/side 30\" x 1 each 30\" x 1 each        BTE ROM T-93 100\" each T-93 100\" each   BTE H.Push/Pull T-177 100\" T-192 100\"   BTE  Push/Pull T-177 100\" T-192 100\"   BTE IR/ER T-63 100\" T-66 100\"        Quadruped-wt shift onto left UE 10X         Sidelying ER 4# 3 x 15 4# 3 x 15   Serratus punches 10# 3 x 15 10# 3 x 15        Pulley flex 3 minutes 3 minutes   An scaption          Diagonal pull down 5# 2x10 ea  Monitor pain                                       Shoulder stabil. tape               HEP       OP EDUCATION:   Not today    Goals:  Pt will have full pain free left shoulder ROM in all planes-8 weeks   10/15/2024 progressing  Pt will have 5/5 left shoulder strength and good stability in all planes-8 weeks   10/21/2024 progressing  Pt will be independent with HEP- goal met  Improve Quickdash score < 15 to facilitate return to prior level of  function-8 weeks      "

## 2024-11-01 ENCOUNTER — DOCUMENTATION (OUTPATIENT)
Dept: PHYSICAL THERAPY | Facility: CLINIC | Age: 24
End: 2024-11-01
Payer: MEDICARE

## 2024-11-01 ENCOUNTER — APPOINTMENT (OUTPATIENT)
Dept: PHYSICAL THERAPY | Facility: CLINIC | Age: 24
End: 2024-11-01
Payer: MEDICARE

## 2024-11-01 DIAGNOSIS — M25.812 IMPINGEMENT OF LEFT SHOULDER: ICD-10-CM

## 2024-11-01 DIAGNOSIS — M25.312 SHOULDER INSTABILITY, LEFT: ICD-10-CM

## 2024-11-04 ENCOUNTER — TREATMENT (OUTPATIENT)
Dept: PHYSICAL THERAPY | Facility: CLINIC | Age: 24
End: 2024-11-04
Payer: MEDICARE

## 2024-11-04 DIAGNOSIS — M25.312 SHOULDER INSTABILITY, LEFT: ICD-10-CM

## 2024-11-04 DIAGNOSIS — M25.812 IMPINGEMENT OF LEFT SHOULDER: ICD-10-CM

## 2024-11-04 PROCEDURE — 97110 THERAPEUTIC EXERCISES: CPT | Mod: GP | Performed by: PHYSICAL THERAPIST

## 2024-11-04 ASSESSMENT — PAIN - FUNCTIONAL ASSESSMENT: PAIN_FUNCTIONAL_ASSESSMENT: 0-10

## 2024-11-04 ASSESSMENT — PAIN DESCRIPTION - DESCRIPTORS: DESCRIPTORS: ACHING

## 2024-11-04 ASSESSMENT — PAIN SCALES - GENERAL: PAINLEVEL_OUTOF10: 2

## 2024-11-04 NOTE — PROGRESS NOTES
Physical Therapy    Physical Therapy Treatment/Reassessment    Patient Name: Cecil Rehman  MRN: [id number]  Today's Date: 2024  :2000  Time Entry:   Time Calculation  Start Time: 245  Stop Time: 335  Time Calculation (min): 50 min  PT Therapeutic Procedures Time Entry  Therapeutic Exercise Time Entry: 40     Non-Billable Time  Non-billable time: 10  Non-billable time reason: HP      Visit:  9  Visit limit: 20 visits per year        Assessment:  Left shoulder ROM and strength have improved since starting PT, but some left shoulder pain persists. Pt is scheduled for MRI tomorrow. Will hold PT until patient receives MRI results and patient will work on HEP.  Plan:   Continue with left posterior shoulder and rotator cuff strengthening program progressing as tolerated to return patient to prior functional status.     Patient scheduled for MRI 2024.      Current Problem     1. Impingement of left shoulder  Follow Up In Physical Therapy       2. Shoulder instability, left  Follow Up In Physical Therapy        Subjective    Pt reports that overall his shoulder has improved, but continues to have pain with overhead  and cross body movements. Paresthesias have improved, but still occur with repetitive movements. Pain    Precautions         Pain  Pain Assessment  Pain Assessment: 0-10  0-10 (Numeric) Pain Score: 2  Pain Location: Shoulder  Pain Orientation: Left  Pain Descriptors: Aching    Objective   Added and increased exercises. See exercise log for specifics.     Left shoulder strength:  Flexion = 5/5  Abduction = 4+/5  Scaption = 5/5  IR=4+/5  ER=4+/5    Left shoulder AROM:  Flexion= 165 degrees  Sijtihxfv=220 degrees  IR=65 degrees  ER= 92 degrees    Special Tests:  Impingement-positive left shoulder    Outcomes  Quick DASH - Score as of 24=22 Raw score      Treatments:  EXERCISES Date 10/15/2024 Date 10/21/2024 11/4/24         Mid row Black 30x Black x 30    Pull down Black 30x Black x 30   "  IR Black 30x Black x 30    ER Black 30x Black x 30    Theraband flexion Black 30x Black x 30    Theraband adduction  Black x 30          Ball on wall 2# A-Z   2X 2# A->Z  x 2          Prone- Hughstons-I, T, Y Next Flexion 2# x 15  H. Abd 2# x 15  Extension 2# x 15  90/90 ER 2# x 15 2#  X 15  Horiz abd  2#  X 15  \"W\"  2#  X 15         Body blade flexion up/side 30\" x 1 each 30\" x 1 each 30\" X 1 ea   Body blade scaption up/side 30\" x 1 each 30\" x 1 each 30\" X 1 ea         BTE ROM T-93 100\" each T-93 100\" each T-93  100\"ea   BTE H.Push/Pull T-177 100\" T-192 100\" T-192  100\"   BTE  Push/Pull T-177 100\" T-192 100\" T-192  100\"   BTE IR/ER T-63 100\" T-66 100\" T-66  100\"         Quadruped-wt shift onto left UE 10X           Sidelying ER 4# 3 x 15 4# 3 x 15 4#  3 X 15   Serratus punches 10# 3 x 15 10# 3 x 15 10#  3 X 15         Pulley flex 3 minutes 3 minutes    An scaption            Diagonal pull down 5# 2x10 ea  Monitor pain                                               Shoulder stabil. tape                  HEP        OP EDUCATION:   Not today    Goals:  Pt will have full pain free left shoulder ROM in all planes-8 weeks   10/15/2024 progressing  Pt will have 5/5 left shoulder strength and good stability in all planes-8 weeks   10/21/2024 progressing  Pt will be independent with HEP- goal met  Improve Quickdash score < 15 to facilitate return to prior level of  function-8 weeks      "